# Patient Record
Sex: MALE | Race: WHITE | Employment: FULL TIME | URBAN - METROPOLITAN AREA
[De-identification: names, ages, dates, MRNs, and addresses within clinical notes are randomized per-mention and may not be internally consistent; named-entity substitution may affect disease eponyms.]

---

## 2021-06-08 ENCOUNTER — APPOINTMENT (OUTPATIENT)
Dept: GENERAL RADIOLOGY | Age: 36
End: 2021-06-08
Payer: COMMERCIAL

## 2021-06-08 ENCOUNTER — HOSPITAL ENCOUNTER (EMERGENCY)
Age: 36
Discharge: HOME | End: 2021-06-09
Payer: COMMERCIAL

## 2021-06-08 DIAGNOSIS — S91.312A LACERATION OF LEFT FOOT, INITIAL ENCOUNTER: Primary | ICD-10-CM

## 2021-06-08 PROCEDURE — 73610 X-RAY EXAM OF ANKLE: CPT

## 2021-06-08 PROCEDURE — 99283 EMERGENCY DEPT VISIT LOW MDM: CPT

## 2021-06-08 PROCEDURE — 12002 RPR S/N/AX/GEN/TRNK2.6-7.5CM: CPT

## 2021-06-08 ASSESSMENT — PAIN SCALES - GENERAL: PAINLEVEL_OUTOF10: 5

## 2021-06-08 ASSESSMENT — PAIN DESCRIPTION - PROGRESSION: CLINICAL_PROGRESSION: NOT CHANGED

## 2021-06-08 ASSESSMENT — PAIN DESCRIPTION - DESCRIPTORS: DESCRIPTORS: ACHING

## 2021-06-08 ASSESSMENT — PAIN DESCRIPTION - ONSET: ONSET: ON-GOING

## 2021-06-08 ASSESSMENT — PAIN DESCRIPTION - LOCATION: LOCATION: FOOT

## 2021-06-08 ASSESSMENT — PAIN DESCRIPTION - FREQUENCY: FREQUENCY: CONTINUOUS

## 2021-06-08 ASSESSMENT — PAIN DESCRIPTION - PAIN TYPE: TYPE: ACUTE PAIN

## 2021-06-08 ASSESSMENT — PAIN DESCRIPTION - ORIENTATION: ORIENTATION: LEFT

## 2021-06-08 ASSESSMENT — PAIN - FUNCTIONAL ASSESSMENT: PAIN_FUNCTIONAL_ASSESSMENT: PREVENTS OR INTERFERES SOME ACTIVE ACTIVITIES AND ADLS

## 2021-06-09 VITALS
RESPIRATION RATE: 15 BRPM | DIASTOLIC BLOOD PRESSURE: 87 MMHG | OXYGEN SATURATION: 99 % | TEMPERATURE: 98.4 F | BODY MASS INDEX: 45.9 KG/M2 | SYSTOLIC BLOOD PRESSURE: 175 MMHG | WEIGHT: 310.85 LBS | HEART RATE: 78 BPM

## 2021-06-09 PROCEDURE — 6360000002 HC RX W HCPCS: Performed by: NURSE PRACTITIONER

## 2021-06-09 PROCEDURE — 90715 TDAP VACCINE 7 YRS/> IM: CPT | Performed by: NURSE PRACTITIONER

## 2021-06-09 PROCEDURE — 90471 IMMUNIZATION ADMIN: CPT | Performed by: NURSE PRACTITIONER

## 2021-06-09 RX ADMIN — TETANUS TOXOID, REDUCED DIPHTHERIA TOXOID AND ACELLULAR PERTUSSIS VACCINE, ADSORBED 0.5 ML: 5; 2.5; 8; 8; 2.5 SUSPENSION INTRAMUSCULAR at 01:02

## 2021-06-09 ASSESSMENT — PAIN SCALES - GENERAL: PAINLEVEL_OUTOF10: 0
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
## 2022-05-23 ENCOUNTER — OFFICE VISIT (OUTPATIENT)
Dept: FAMILY MEDICINE CLINIC | Age: 37
End: 2022-05-23
Payer: COMMERCIAL

## 2022-05-23 VITALS
WEIGHT: 315 LBS | BODY MASS INDEX: 46.65 KG/M2 | SYSTOLIC BLOOD PRESSURE: 136 MMHG | RESPIRATION RATE: 16 BRPM | HEART RATE: 81 BPM | TEMPERATURE: 98.4 F | HEIGHT: 69 IN | DIASTOLIC BLOOD PRESSURE: 86 MMHG | OXYGEN SATURATION: 97 %

## 2022-05-23 DIAGNOSIS — D17.22 LIPOMA OF LEFT UPPER EXTREMITY: ICD-10-CM

## 2022-05-23 DIAGNOSIS — R06.83 SNORING: ICD-10-CM

## 2022-05-23 DIAGNOSIS — R10.9 LEFT FLANK PAIN: ICD-10-CM

## 2022-05-23 DIAGNOSIS — R03.0 ELEVATED BP WITHOUT DIAGNOSIS OF HYPERTENSION: ICD-10-CM

## 2022-05-23 DIAGNOSIS — E66.01 OBESITY, MORBID, BMI 40.0-49.9 (HCC): ICD-10-CM

## 2022-05-23 PROCEDURE — 99214 OFFICE O/P EST MOD 30 MIN: CPT | Performed by: NURSE PRACTITIONER

## 2022-05-23 RX ORDER — CYCLOBENZAPRINE HCL 10 MG
10 TABLET ORAL NIGHTLY PRN
Qty: 10 TABLET | Refills: 0 | Status: SHIPPED | OUTPATIENT
Start: 2022-05-23 | End: 2022-06-02

## 2022-05-23 RX ORDER — FAMOTIDINE 20 MG/1
20 TABLET, FILM COATED ORAL 2 TIMES DAILY
COMMUNITY
End: 2022-06-07 | Stop reason: SDUPTHER

## 2022-05-23 ASSESSMENT — ENCOUNTER SYMPTOMS
NAUSEA: 0
CONSTIPATION: 0
ABDOMINAL PAIN: 0
BLOOD IN STOOL: 0
SHORTNESS OF BREATH: 0
DIARRHEA: 0
COUGH: 0
VOMITING: 0

## 2022-05-23 ASSESSMENT — PATIENT HEALTH QUESTIONNAIRE - PHQ9
1. LITTLE INTEREST OR PLEASURE IN DOING THINGS: 0
SUM OF ALL RESPONSES TO PHQ QUESTIONS 1-9: 0
2. FEELING DOWN, DEPRESSED OR HOPELESS: 0
SUM OF ALL RESPONSES TO PHQ9 QUESTIONS 1 & 2: 0

## 2022-05-23 NOTE — PROGRESS NOTES
tablet Take 1 tablet by mouth 2 times daily (Patient not taking: Reported on 5/23/2022) 60 tablet 3     No current facility-administered medications for this visit. No Known Allergies    Review of Systems   Constitutional: Negative for activity change and fever. Respiratory: Negative for cough and shortness of breath. Cardiovascular: Negative for chest pain. Gastrointestinal: Negative for abdominal pain, blood in stool, constipation, diarrhea, nausea and vomiting. Genitourinary: Positive for flank pain. Negative for difficulty urinating, dysuria and hematuria. Neurological: Negative for dizziness and headaches. Vitals:    05/23/22 0909   BP: 136/86   Site: Right Upper Arm   Position: Sitting   Cuff Size: Large Adult   Pulse: 81   Resp: 16   Temp: 98.4 °F (36.9 °C)   TempSrc: Oral   SpO2: 97%   Weight: (!) 327 lb (148.3 kg)   Height: 5' 9\" (1.753 m)       Body mass index is 48.29 kg/m². Wt Readings from Last 3 Encounters:   05/23/22 (!) 327 lb (148.3 kg)   06/08/21 (!) 310 lb 13.6 oz (141 kg)   10/20/16 (!) 354 lb (160.6 kg)       BP Readings from Last 3 Encounters:   05/23/22 136/86   06/09/21 (!) 175/87   10/20/16 120/78       Physical Exam  Vitals and nursing note reviewed. Constitutional:       General: He is not in acute distress. Appearance: He is well-developed. HENT:      Head: Normocephalic and atraumatic. Cardiovascular:      Rate and Rhythm: Normal rate and regular rhythm. Heart sounds: Normal heart sounds. No murmur heard. No friction rub. No gallop. Pulmonary:      Effort: Pulmonary effort is normal. No respiratory distress. Breath sounds: Normal breath sounds. Musculoskeletal:      Cervical back: Neck supple. Skin:     General: Skin is warm and dry. Comments: Left lateral upper arm subcutaneous nodule. Area is slightly tender to palpation. Neurological:      Mental Status: He is alert and oriented to person, place, and time.    Psychiatric: Behavior: Behavior normal.         Thought Content: Thought content normal.         Judgment: Judgment normal.         Assessmentand Plan  Kathya Friend was seen today. Diagnoses and all orders for this visit:    Elevated BP without diagnosis of hypertension  -     Comprehensive Metabolic Panel; Future  Discussed with patient that blood pressure today is high end of normal.  Discussed the importance of a low-salt diet, aerobic exercise, and weight loss. Will continue to monitor blood pressure in the future. Left flank pain  -     cyclobenzaprine (FLEXERIL) 10 MG tablet; Take 1 tablet by mouth nightly as needed for Muscle spasms  Suspect that pain is muscular skeletal in nature. We will have him take over-the-counter ibuprofen 400 to 600 mg 3 times daily as needed with food. Cyclobenzaprine 10 mg nightly as needed. Advised to ice area as needed. Patient is to call if symptoms worsen or fail to improve before next follow-up visit. Lipoma of left upper extremity  -     [facility]fani Smith MD, General Surgery, [facility]  Patient reports area is enlarging. Will refer to general surgery for evaluation. Obesity, morbid, BMI 40.0-49.9 ([address]  -     Comprehensive Metabolic Panel; Future  -     Hemoglobin A1C; Future  -     Lipid Panel; Future  Diet, aerobic exercise, and weight loss discussed and needed. Patient declines referral to [facility]. Snoring  -     Gayatri Prescott MD, Sleep Medicine, [facility]  Refer to sleep specialist for sleep study to evaluate for sleep apnea. Return in about 2 weeks (around 6/6/2022), or if symptoms worsen or fail to improve, for side pain .

## 2022-06-01 DIAGNOSIS — R03.0 ELEVATED BP WITHOUT DIAGNOSIS OF HYPERTENSION: ICD-10-CM

## 2022-06-01 DIAGNOSIS — E66.01 OBESITY, MORBID, BMI 40.0-49.9 (HCC): ICD-10-CM

## 2022-06-01 LAB
A/G RATIO: 1.6 (ref 1.1–2.2)
ALBUMIN SERPL-MCNC: 4.3 G/DL (ref 3.4–5)
ALP BLD-CCNC: 78 U/L (ref 40–129)
ALT SERPL-CCNC: 13 U/L (ref 10–40)
ANION GAP SERPL CALCULATED.3IONS-SCNC: 14 MMOL/L (ref 3–16)
AST SERPL-CCNC: 13 U/L (ref 15–37)
BILIRUB SERPL-MCNC: 0.3 MG/DL (ref 0–1)
BUN BLDV-MCNC: 13 MG/DL (ref 7–20)
CALCIUM SERPL-MCNC: 9.3 MG/DL (ref 8.3–10.6)
CHLORIDE BLD-SCNC: 98 MMOL/L (ref 99–110)
CHOLESTEROL, TOTAL: 176 MG/DL (ref 0–199)
CO2: 26 MMOL/L (ref 21–32)
CREAT SERPL-MCNC: 0.9 MG/DL (ref 0.9–1.3)
GFR AFRICAN AMERICAN: >60
GFR NON-AFRICAN AMERICAN: >60
GLUCOSE BLD-MCNC: 97 MG/DL (ref 70–99)
HDLC SERPL-MCNC: 29 MG/DL (ref 40–60)
LDL CHOLESTEROL CALCULATED: 104 MG/DL
POTASSIUM SERPL-SCNC: 4.3 MMOL/L (ref 3.5–5.1)
SODIUM BLD-SCNC: 138 MMOL/L (ref 136–145)
TOTAL PROTEIN: 7 G/DL (ref 6.4–8.2)
TRIGL SERPL-MCNC: 213 MG/DL (ref 0–150)
VLDLC SERPL CALC-MCNC: 43 MG/DL

## 2022-06-02 LAB
ESTIMATED AVERAGE GLUCOSE: 102.5 MG/DL
HBA1C MFR BLD: 5.2 %

## 2022-06-07 ENCOUNTER — OFFICE VISIT (OUTPATIENT)
Dept: FAMILY MEDICINE CLINIC | Age: 37
End: 2022-06-07
Payer: COMMERCIAL

## 2022-06-07 VITALS
OXYGEN SATURATION: 97 % | WEIGHT: 315 LBS | HEART RATE: 86 BPM | DIASTOLIC BLOOD PRESSURE: 88 MMHG | SYSTOLIC BLOOD PRESSURE: 138 MMHG | BODY MASS INDEX: 46.65 KG/M2 | RESPIRATION RATE: 18 BRPM | HEIGHT: 69 IN

## 2022-06-07 DIAGNOSIS — K21.9 GASTROESOPHAGEAL REFLUX DISEASE WITHOUT ESOPHAGITIS: ICD-10-CM

## 2022-06-07 DIAGNOSIS — F41.9 ANXIETY: ICD-10-CM

## 2022-06-07 DIAGNOSIS — R10.9 LEFT FLANK PAIN: Primary | ICD-10-CM

## 2022-06-07 DIAGNOSIS — D17.22 LIPOMA OF LEFT UPPER EXTREMITY: ICD-10-CM

## 2022-06-07 PROCEDURE — 99213 OFFICE O/P EST LOW 20 MIN: CPT

## 2022-06-07 RX ORDER — FAMOTIDINE 20 MG/1
20 TABLET, FILM COATED ORAL 2 TIMES DAILY
Qty: 60 TABLET | Refills: 2 | Status: SHIPPED | OUTPATIENT
Start: 2022-06-07 | End: 2022-09-20

## 2022-06-07 ASSESSMENT — ENCOUNTER SYMPTOMS
SHORTNESS OF BREATH: 0
ABDOMINAL PAIN: 0
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
## 2022-06-16 ENCOUNTER — OFFICE VISIT (OUTPATIENT)
Dept: SURGERY | Age: 37
End: 2022-06-16
Payer: COMMERCIAL

## 2022-06-16 VITALS
WEIGHT: 315 LBS | DIASTOLIC BLOOD PRESSURE: 104 MMHG | SYSTOLIC BLOOD PRESSURE: 150 MMHG | BODY MASS INDEX: 47.55 KG/M2 | HEART RATE: 84 BPM

## 2022-06-16 DIAGNOSIS — D17.22 LIPOMA OF LEFT UPPER EXTREMITY: Primary | ICD-10-CM

## 2022-06-16 PROCEDURE — 99203 OFFICE O/P NEW LOW 30 MIN: CPT | Performed by: SURGERY

## 2022-06-16 PROCEDURE — 24075 EXC ARM/ELBOW LES SC < 3 CM: CPT | Performed by: SURGERY
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
## 2022-06-16 NOTE — Clinical Note
Camelia Li,     I just saw Mr. Devyn Flores for his left upper arm lipoma. I excised it in the office, which he tolerated well. He is going to follow up with me as needed. Thank you for allowing me to take care of Mr. Stanley with you.     Tita Barraza

## 2022-06-16 NOTE — PATIENT INSTRUCTIONS
Numbing medication will wear off in 1-2 hours. OK to take tylenol or ibuprofen for pain control. OK to shower overtop of water tight glue. Avoid swimming for 2-3 weeks. Sutures are dissolvable. These will absorb over time. No restrictions, activities as tolerated. Follow up as needed. Dr. Guy Hamilton will call with the pathology results. Call for any worsening redness, pain, or fevers greater than 101.5  If questions or concerns, please call Nicole @ [phone].

## 2022-09-20 DIAGNOSIS — K21.9 GASTROESOPHAGEAL REFLUX DISEASE WITHOUT ESOPHAGITIS: ICD-10-CM

## 2022-09-20 RX ORDER — FAMOTIDINE 20 MG/1
TABLET, FILM COATED ORAL
Qty: 180 TABLET | Refills: 1 | Status: SHIPPED | OUTPATIENT
Start: 2022-09-20

## 2022-12-08 ENCOUNTER — OFFICE VISIT (OUTPATIENT)
Dept: FAMILY MEDICINE CLINIC | Age: 37
End: 2022-12-08
Payer: COMMERCIAL

## 2022-12-08 VITALS
HEART RATE: 82 BPM | DIASTOLIC BLOOD PRESSURE: 80 MMHG | WEIGHT: 315 LBS | OXYGEN SATURATION: 97 % | HEIGHT: 69 IN | BODY MASS INDEX: 46.65 KG/M2 | SYSTOLIC BLOOD PRESSURE: 122 MMHG

## 2022-12-08 DIAGNOSIS — K21.9 GASTROESOPHAGEAL REFLUX DISEASE WITHOUT ESOPHAGITIS: ICD-10-CM

## 2022-12-08 DIAGNOSIS — I83.93 ASYMPTOMATIC VARICOSE VEINS OF BOTH LOWER EXTREMITIES: Primary | ICD-10-CM

## 2022-12-08 DIAGNOSIS — E66.01 OBESITY, MORBID, BMI 40.0-49.9 (HCC): ICD-10-CM

## 2022-12-08 PROCEDURE — 99213 OFFICE O/P EST LOW 20 MIN: CPT | Performed by: FAMILY MEDICINE

## 2022-12-08 NOTE — PATIENT INSTRUCTIONS
INSTRUCTIONS  NEXT APPOINTMENT: Please schedule fasting annual physical (30 minutes) in 6 months. OK to have water, black coffee and medications (except for diabetes medicines)   Look into cell phone raffi \"Lose it\" or online at [email or web address]. Check weight couple times a week. Read handouts on varicose veins and weight. Patient Education      TIPS ON WEIGHT LOSS    Weight loss maintenance is considered successful if you lose at least 10 percent of your body weight and keep that weight off for at least one year. Ideas for better weight control. Try implementing one a week. Drink only sugar free beverages. Drink at least 8 cups per day. Do not eat after 7 PM.  Snack every 2 hours during the day on 100 calorie snacks (apple, strawberry, almonds, pistachio, walnuts, cheese cubes, raw veggies like bell peppers, tomato, celery, zucchini, snow peas, broccoli). At meals, limit portion sizes to what you could hold in your hand of a meat. Eat all of the raw vegetables and salads that you want with vinegar or low anel dressing. Sleep 8 hours at night. Minimize white starches- bread, pasta, rice potatoes. Try high fiber cereal, breads, granola. Move more- try walking 15 minutes per day. Use small plates and bowls to make serving look bigger. Keeping track of calories and fat grams. Try cell phone raffi called \"Lose-it\"  Planning your meals ahead of time  Eating breakfast every day  Keeping your diet steady. Eating the same on weekends,vacations and special occasions. Watching less than 10 hours of television per week    Should I take weight loss medicines? Taking medicines may work better than diet and exercise alone for some people. OTC orlistat (brand: Hanna Hair) can help weight loss. A multivitamin must be taken every day to prevent vitamin deficiencies. Many people regain weight after they stop taking these medicines. Should I have weight loss surgery?    Surgery can help with long-term weight loss maintenance, BUT people who make major lifestyle changes can get the same results. Patient Education      VARICOSE VEINS    What Are Varicose Veins? Varicose (GABBI-i-kos) veins are swollen, twisted veins that you can see just under the surface of the skin. These veins usually occur in the legs, but they also can form in other parts of the body. Varicose veins are a common condition. They usually cause few signs and symptoms. Sometimes varicose veins cause mild to moderate pain, blood clots, skin ulcers (sores), or other problems. Overview  Veins are blood vessels that carry blood from your body's tissues to your heart. Your heart pumps the blood to your lungs to  oxygen. The oxygen-rich blood then is pumped to your body through blood vessels called arteries. From your arteries, the blood flows through tiny blood vessels called capillaries, where it gives up its oxygen to the body's tissues. Your blood then returns to your heart through your veins to  more oxygen. For more information about blood flow, go to the Health Topics How the Heart Works article. Veins have one-way valves that help keep blood flowing toward your heart. If the valves are weak or damaged, blood can back up and pool in your veins. This causes the veins to swell, which can lead to varicose veins. Many factors can raise your risk for varicose veins. Examples of these factors include family history, older age, gender, pregnancy, overweight or obesity, and lack of movement. Varicose veins are treated with lifestyle changes and medical procedures. The goals of treatment are to relieve symptoms, prevent complications, and improve appearance. [place]  Varicose veins usually don't cause medical problems. If they do, your doctor may simply suggest making lifestyle changes. Sometimes varicose veins cause pain, blood clots, skin ulcers, or other problems.  If this happens, your doctor may recommend one or more medical procedures. Some people choose to have these procedures to improve the way their veins look or to relieve pain. Many treatments for varicose veins are quick and easy and don't require a long recovery. Vein Problems Related to Varicose Veins  Many vein problems are related to varicose veins, such as telangiectasias (mge-HN-rjy-av-QE-fy-uhs), spider veins, varicoceles (IMY-z-ie-seals), and other vein problems. Telangiectasias  Telangiectasias are small clusters of blood vessels. They're usually found on the upper body, including the face. These blood vessels appear red. They may form during pregnancy, and often they develop in people who have certain genetic disorders, viral infections, or other conditions, such as liver disease. Because telangiectasias can be a sign of a more serious condition, see your doctor if you think you have them. Spider Veins  Spider veins are a smaller version of varicose veins and a less serious type of telangiectasias. Spider veins involve the capillaries, the smallest blood vessels in the body. Spider veins often appear on the legs and face. They're red or blue and usually look like a spider web or tree branch. These veins usually aren't a medical concern. Varicoceles  Varicoceles are varicose veins in the scrotum (the skin over the testicles). Varicoceles may be linked to male infertility. If you think you have varicoceles, see your doctor. Other Related Vein Problems  Other types of varicose veins include venous lakes, reticular veins, and hemorrhoids. Venous lakes are varicose veins that appear on the face and neck. Reticular veins are flat blue veins often seen behind the knees. Hemorrhoids are varicose veins in and around the anus. What Causes Varicose Veins? Weak or damaged valves in the veins can cause varicose veins. After your arteries and capillaries deliver oxygen-rich blood to your body, your veins return the blood to your heart.  The veins in your legs must work against gravity to do this. One-way valves inside the veins open to let blood flow through, and then they shut to keep blood from flowing backward. If the valves are weak or damaged, blood can back up and pool in your veins. This causes the veins to swell. Weak vein walls may cause weak valves. Normally, the walls of the veins are elastic (stretchy). If these walls become weak, they lose their normal elasticity. They become like an overstretched rubber band. This makes the walls of the veins longer and wider, and it causes the flaps of the valves to separate. When the valve flaps separate, blood can flow backward through the valves. The backflow of blood fills the veins and stretches the walls even more. As a result, the veins get bigger, swell, and often twist as they try to squeeze into their normal space. These are varicose veins. Normal Vein and Varicose Vein    Figure A shows a normal vein with a working valve and normal blood flow. Figure B shows a varicose vein with a deformed valve, abnormal blood flow, and thin, stretched walls. The middle image shows where varicose veins might appear in a leg. Older age or a family history of varicose veins may raise your risk for weak vein walls. You also may be at higher risk if you have increased pressure in your veins due to overweight or obesity or pregnancy. Who Is at Risk for Varicose Veins? Many factors may raise your risk for varicose veins, including family history, older age, gender, pregnancy, overweight or obesity, and lack of movement. Family History  Having family members who have varicose veins may raise your risk for the condition. About half of all people who have varicose veins have a family history of them. Older Age  Getting older may raise your risk for varicose veins. The normal wear and tear of aging may cause the valves in your veins to weaken and not work well.   Gender  Women tend to get varicose veins more often than men. Hormonal changes that occur during puberty, pregnancy, and menopause (or with the use of birth control pills) may raise a woman's risk for varicose veins. Pregnancy  During pregnancy, the growing fetus puts pressure on the veins in the mother's legs. Varicose veins that occur during pregnancy usually get better within 3 to   12 months of delivery. Overweight or Obesity  Being overweight or obese can put extra pressure on your veins. This can lead to varicose veins. For more information about overweight and obesity, go to the Health Topics Overweight and Obesity article. Lack of Movement  Standing or sitting for a long time, especially with your legs bent or crossed, may raise your risk for varicose veins. This is because staying in one position for a long time may force your veins to work harder to pump blood to your heart. What Are the Signs and Symptoms of Varicose Veins? The signs and symptoms of varicose veins include:  Large veins that you can see just under the surface of your skin. Mild swelling of your ankles and feet. Painful, achy, or \"heavy\" legs. Throbbing or cramping in your legs. Itchy legs, especially on the lower leg and ankle. Sometimes this symptom is incorrectly diagnosed as dry skin. Discolored skin in the area around the varicose vein. Signs of telangiectasias are clusters of red veins that you can see just under the surface of your skin. These clusters usually are found on the upper body, including the face. Signs of spider veins are red or blue veins in a web or tree branch pattern. Often, these veins appear on the legs and face. See your doctor if you have these signs and symptoms. They also may be signs of other, more serious conditions. Complications of Varicose Veins  Varicose veins can lead to dermatitis (fal-vg-SC-tis), an itchy rash. If you have varicose veins in your legs, dermatitis may affect your lower leg or ankle.  Dermatitis can cause bleeding or skin ulcers (sores) if the skin is scratched or irritated. Varicose veins also can lead to a condition called superficial thrombophlebitis (JNSUH-cm-nmaj-BI-tis). Thrombophlebitis is a blood clot in a vein. Superficial thrombophlebitis means that the blood clot occurs in a vein close to the surface of the skin. This type of blood clot may cause pain and other problems in the affected area. How Are Varicose Veins Diagnosed? Doctors often diagnose varicose veins based on a physical exam alone. Sometimes tests or procedures are used to find out the extent of the problem or to rule out other conditions. Diagnostic Tests and Procedures  Doppler Ultrasound  Your doctor may recommend a Doppler ultrasound to check blood flow in your veins and to look for blood clots. A Doppler ultrasound uses sound waves to create pictures of the structures in your body. During this test, a handheld device will be placed on your body and passed back and forth over the affected area. The device sends and receives sound waves. A computer will convert the sound waves into a picture of the blood flow in your arteries and veins. Angiogram  Your doctor may recommend an angiogram to get a more detailed look at the blood flow through your veins. For this procedure, dye is injected into your veins. The dye outlines your veins on x-ray images. An angiogram can help your doctor confirm whether you have varicose veins or another condition. How Are Varicose Veins Treated? Varicose veins are treated with lifestyle changes and medical procedures. The goals of treatment are to relieve symptoms, prevent complications, and improve appearance. If varicose veins cause few symptoms, your doctor may simply suggest making lifestyle changes. If your symptoms are more severe, your doctor may recommend one or more medical procedures.  For example, you may need a medical procedure if you have a lot of pain, blood clots, or skin disorders caused by your varicose veins. Some people who have varicose veins choose to have procedures to improve how their veins look. Although treatment can help existing varicose veins, it can't keep new varicose veins from forming. Lifestyle Changes  Lifestyle changes often are the first treatment for varicose veins. These changes can prevent varicose veins from getting worse, reduce pain, and delay other varicose veins from forming. Lifestyle changes include the following:  Avoid standing or sitting for long periods without taking a break. When sitting, avoid crossing your legs. Keep your legs raised when sitting, resting, or sleeping. When you can, raise your legs above the level of your heart. Do physical activities to get your legs moving and improve muscle tone. This helps blood move through your veins. If you're overweight or obese, try to lose weight. This will improve blood flow and ease the pressure on your veins. Avoid wearing tight clothes, especially those that are tight around your waist, groin (upper thighs), and legs. Tight clothes can make varicose veins worse. Avoid wearing high heels for long periods. Lower heeled shoes can help tone your calf muscles. Toned muscles help blood move through the veins. Your doctor may recommend compression stockings. These stockings create gentle pressure up the leg. This pressure keeps blood from pooling and decreases swelling in the legs. There are three types of compression stockings. One type is support pantyhose. These offer the least amount of pressure. A second type is over-the-counter compression hose. These stockings give a little more pressure than support pantyhose. Over-the-counter compression hose are sold in medical supply stores and pharmacies. Prescription-strength compression hose are the third type of compression stockings. These stockings offer the greatest amount of pressure. They also are sold in medical supply stores and pharmacies.  However, you need to be fitted for them in the store by a specially trained person. Medical Procedures  Medical procedures are done either to remove varicose veins or to close them. Removing or closing varicose veins usually doesn't cause problems with blood flow because the blood starts moving through other veins. You may be treated with one or more of the procedures described below. Common side effects right after most of these procedures include bruising, swelling, skin discoloration, and slight pain. The side effects are most severe with vein stripping and ligation (li-GA-shun). Rarely, this procedure can cause severe pain, infections, blood clots, and scarring. Sclerotherapy  Sclerotherapy (IMLFT-i-gpjw-ah-pe) uses a liquid chemical to close off a varicose vein. The chemical is injected into the vein to cause irritation and scarring inside the vein. The irritation and scarring cause the vein to close off, and it fades away. This procedure often is used to treat smaller varicose veins and spider veins. It can be done in your doctor's office, while you stand. You may need several treatments to completely close off a vein. Treatments typically are done every 4 to 6 weeks. Following treatments, your legs will be wrapped in elastic bandaging to help with healing and decrease swelling. Microsclerotherapy  Microsclerotherapy (ZQ-iru-OZKJA-r-fxux-vz-pe) is used to treat spider veins and other very small varicose veins. A small amount of liquid chemical is injected into a vein using a very fine needle. The chemical scars the inner lining of the vein, causing it to close off. Laser Surgery  This procedure applies light energy from a laser onto a varicose vein. The laser light makes the vein fade away. Laser surgery mostly is used to treat smaller varicose veins. No cutting or injection of chemicals is involved.     Endovenous Ablation Therapy  Endovenous ablation (ab-LA-shun) therapy uses lasers or radiowaves to create heat to close off a varicose vein. Your doctor makes a tiny cut in your skin near the varicose vein. He or she then inserts a small tube called a catheter into the vein. A device at the tip of the tube heats up the inside of the vein and closes it off. You'll be awake during this procedure, but your doctor will numb the area around the vein. You usually can go home the same day as the procedure. Endoscopic Vein Surgery  For endoscopic (pt-es-KXSE-ik) vein surgery, your doctor will make a small cut in your skin near a varicose vein. He or she then uses a tiny camera at the end of a thin tube to move through the vein. A surgical device at the end of the camera is used to close the vein. Endoscopic vein surgery usually is used only in severe cases when varicose veins are causing skin ulcers (sores). After the procedure, you usually can return to your normal activities within a few weeks. Ambulatory Phlebectomy  For ambulatory phlebectomy (dfg-OCD-af-me), your doctor will make small cuts in your skin to remove small varicose veins. This procedure usually is done to remove the varicose veins closest to the surface of your skin. You'll be awake during the procedure, but your doctor will numb the area around the vein. Usually, you can go home the same day that the procedure is done. Vein Stripping and Ligation  Vein stripping and ligation typically is done only for severe cases of varicose veins. The procedure involves tying shut and removing the veins through small cuts in your skin. You'll be given medicine to temporarily put you to sleep so you don't feel any pain during the procedure. Vein stripping and ligation usually is done as an outpatient procedure. The recovery time from the procedure is about 1 to 4 weeks. How Can Varicose Veins Be Prevented? You can't prevent varicose veins from forming. However, you can prevent the ones you have from getting worse.  You also can take steps to delay other varicose veins from forming. Avoid standing or sitting for long periods without taking a break. When sitting, avoid crossing your legs. Keep your legs raised when sitting, resting, or sleeping. When you can, raise your legs above the level of your heart. Do physical activities to get your legs moving and improve muscle tone. This helps blood move through your veins. If you're overweight or obese, try to lose weight. This will improve blood flow and ease the pressure on your veins. Avoid wearing tight clothes, especially those that are tight around your waist, groin (upper thighs), and legs. Tight clothes can make varicose veins worse. Avoid wearing high heels for long periods. Lower heeled shoes can help tone your calf muscles. Toned muscles help blood move through the veins. Wear compression stockings if your doctor recommends them. These stockings create gentle pressure up the leg. This pressure keeps blood from pooling in the veins and decreases swelling in the legs. Living With Varicose Veins  Varicose veins are a common condition. They often cause few signs and symptoms. If your signs and symptoms are minor, your doctor may simply suggest making lifestyle changes. If your condition is more severe--for example, if you have pain, blood clots, or skin ulcers (sores)--your doctor may recommend one or more medical procedures. Many treatments for varicose veins are quick and easy and don't require a long recovery.

## 2022-12-08 NOTE — PROGRESS NOTES
PROBLEM VISIT NOTE     Subjective:     Chief Complaint   Patient presents with    Leg Pain     6 mo f/u legs hurt , check circulation     Federica Le is a 40 y.o. male who presents log joints ache when stand at work all day. Leg muscle spasms better with hydration and potassium. Concerned about veins in legs. Denies claudication. Health Maintenance Due   Topic Date Due    COVID-19 Vaccine (1) Never done    Varicella vaccine (1 of 2 - 2-dose childhood series) Never done    HIV screen  Never done    Hepatitis C screen  Never done    Flu vaccine (1) 08/01/2022     CHART REVIEW   reports that he has been smoking cigarettes.  He has never used smokeless tobacco.  Health Maintenance Due   Topic Date Due    COVID-19 Vaccine (1) Never done    Varicella vaccine (1 of 2 - 2-dose childhood series) Never done    HIV screen  Never done    Hepatitis C screen  Never done    Flu vaccine (1) 08/01/2022     Current Outpatient Medications   Medication Instructions    famotidine (PEPCID) 20 MG tablet TAKE 1 TABLET(20 MG) BY MOUTH TWICE DAILY     LAST LABS  LDL Calculated   Date Value Ref Range Status   06/01/2022 104 (H) <100 mg/dL Final     Lab Results   Component Value Date    HDL 29 (L) 06/01/2022     Lab Results   Component Value Date    TRIG 213 (H) 06/01/2022     Lab Results   Component Value Date     06/01/2022    K 4.3 06/01/2022    CREATININE 0.9 06/01/2022     No results found for: WBC, HGB, PLT  Lab Results   Component Value Date    ALT 13 06/01/2022    AST 13 (L) 06/01/2022    ALKPHOS 78 06/01/2022    BILITOT 0.3 06/01/2022     No results found for: TSH  Lab Results   Component Value Date    LABA1C 5.2 06/01/2022     Objective:   PHYSICAL EXAM   /80 (Site: Left Upper Arm, Position: Sitting, Cuff Size: Large Adult)   Pulse 82   Ht 5' 9\" (1.753 m)   Wt (!) 325 lb (147.4 kg)   SpO2 97%   BMI 47.99 kg/m²   BP Readings from Last 5 Encounters:   12/08/22 122/80   06/16/22 (!) 150/104   06/07/22 138/88 05/23/22 136/86   06/09/21 (!) 175/87     Wt Readings from Last 5 Encounters:   12/08/22 (!) 325 lb (147.4 kg)   06/16/22 (!) 322 lb (146.1 kg)   06/07/22 (!) 324 lb (147 kg)   05/23/22 (!) 327 lb (148.3 kg)   06/08/21 (!) 310 lb 13.6 oz (141 kg)      GENERAL:   well-developed, well-nourished, alert, no distress. LUNGS:    Breathing unlabored  clear to auscultation bilaterally and good air movement  CARDIOVASC:   regular rate and rhythm  Scattered varicose veins of lower legs and thighs  SKIN: warm and dry     Assessment and Plan:      Diagnosis Orders   1. Asymptomatic varicose veins of both lower extremities        2. Gastroesophageal reflux disease without esophagitis        3. Obesity, morbid, BMI 40.0-49.9 ([address]           INSTRUCTIONS  NEXT APPOINTMENT: Please schedule fasting annual physical (30 minutes) in 6 months. OK to have water, black coffee and medications (except for diabetes medicines)   Look into cell phone raffi \"Lose it\" or online at [email or web address]. Check weight couple times a week. Read handouts on varicose veins and weight.

## 2023-02-07 ENCOUNTER — OFFICE VISIT (OUTPATIENT)
Dept: FAMILY MEDICINE CLINIC | Age: 38
End: 2023-02-07
Payer: COMMERCIAL

## 2023-02-07 VITALS
SYSTOLIC BLOOD PRESSURE: 122 MMHG | BODY MASS INDEX: 46.65 KG/M2 | HEART RATE: 70 BPM | WEIGHT: 315 LBS | DIASTOLIC BLOOD PRESSURE: 82 MMHG | OXYGEN SATURATION: 97 % | HEIGHT: 69 IN

## 2023-02-07 DIAGNOSIS — Z00.00 ENCOUNTER FOR WELL ADULT EXAM WITHOUT ABNORMAL FINDINGS: Primary | ICD-10-CM

## 2023-02-07 DIAGNOSIS — E66.01 OBESITY, MORBID, BMI 40.0-49.9 (HCC): ICD-10-CM

## 2023-02-07 DIAGNOSIS — Z72.0 TOBACCO ABUSE: ICD-10-CM

## 2023-02-07 DIAGNOSIS — K21.9 GASTROESOPHAGEAL REFLUX DISEASE WITHOUT ESOPHAGITIS: ICD-10-CM

## 2023-02-07 DIAGNOSIS — Z28.21 COVID-19 VACCINE SERIES DECLINED: ICD-10-CM

## 2023-02-07 DIAGNOSIS — Z23 NEEDS FLU SHOT: ICD-10-CM

## 2023-02-07 DIAGNOSIS — Z28.310 COVID-19 VACCINE SERIES DECLINED: ICD-10-CM

## 2023-02-07 DIAGNOSIS — F41.9 ANXIETY: ICD-10-CM

## 2023-02-07 PROCEDURE — 99395 PREV VISIT EST AGE 18-39: CPT

## 2023-02-07 SDOH — ECONOMIC STABILITY: FOOD INSECURITY: WITHIN THE PAST 12 MONTHS, THE FOOD YOU BOUGHT JUST DIDN'T LAST AND YOU DIDN'T HAVE MONEY TO GET MORE.: NEVER TRUE

## 2023-02-07 SDOH — ECONOMIC STABILITY: HOUSING INSECURITY
IN THE LAST 12 MONTHS, WAS THERE A TIME WHEN YOU DID NOT HAVE A STEADY PLACE TO SLEEP OR SLEPT IN A SHELTER (INCLUDING NOW)?: NO

## 2023-02-07 SDOH — ECONOMIC STABILITY: INCOME INSECURITY: HOW HARD IS IT FOR YOU TO PAY FOR THE VERY BASICS LIKE FOOD, HOUSING, MEDICAL CARE, AND HEATING?: NOT HARD AT ALL

## 2023-02-07 SDOH — ECONOMIC STABILITY: FOOD INSECURITY: WITHIN THE PAST 12 MONTHS, YOU WORRIED THAT YOUR FOOD WOULD RUN OUT BEFORE YOU GOT MONEY TO BUY MORE.: NEVER TRUE

## 2023-02-07 ASSESSMENT — PATIENT HEALTH QUESTIONNAIRE - PHQ9
SUM OF ALL RESPONSES TO PHQ9 QUESTIONS 1 & 2: 0
SUM OF ALL RESPONSES TO PHQ QUESTIONS 1-9: 0
2. FEELING DOWN, DEPRESSED OR HOPELESS: 0
1. LITTLE INTEREST OR PLEASURE IN DOING THINGS: 0
SUM OF ALL RESPONSES TO PHQ QUESTIONS 1-9: 0

## 2023-02-07 NOTE — PATIENT INSTRUCTIONS
Starting a Weight Loss Plan: Care Instructions  Overview     If you're thinking about losing weight, it can be hard to know where to start. Your doctor can help you set up a weight loss plan that best meets your needs. You may want to take a class on nutrition or exercise, or you could join a weight loss support group. If you have questions about how to make changes to your eating or exercise habits, ask your doctor about seeing a registered dietitian or an exercise specialist.  It can be a big challenge to lose weight. But you don't have to make huge changes at once. Make small changes, and stick with them. When those changes become habit, add a few more changes.  If you don't think you're ready to make changes right now, try to pick a date in the future. Make an appointment to see your doctor to discuss whether the time is right for you to start a plan.  Follow-up care is a key part of your treatment and safety. Be sure to make and go to all appointments, and call your doctor if you are having problems. It's also a good idea to know your test results and keep a list of the medicines you take.  How can you care for yourself at home?  Set realistic goals. Many people expect to lose much more weight than is likely. A weight loss of 5% to 10% of your body weight may be enough to improve your health.  Get family and friends involved to provide support. Talk to them about why you are trying to lose weight, and ask them to help. They can help by participating in exercise and having meals with you, even if they may be eating something different.  Find what works best for you. If you do not have time or do not like to cook, a program that offers meal replacement bars or shakes may be better for you. Or if you like to prepare meals, finding a plan that includes daily menus and recipes may be best.  Ask your doctor about other health professionals who can help you achieve your weight loss goals.  A dietitian can help  you make healthy changes in your diet. An exercise specialist or  can help you develop a safe and effective exercise program.  A counselor or psychiatrist can help you cope with issues such as depression, anxiety, or family problems that can make it hard to focus on weight loss. Consider joining a support group for people who are trying to lose weight. Your doctor can suggest groups in your area. Where can you learn more? Go to http://www.woods.com/ and enter U357 to learn more about \"Starting a Weight Loss Plan: Care Instructions. \"  Current as of: August 25, 2022               Content Version: 13.5  © 4294-5233 [employer]wise, [employer]orporated. Care instructions adapted under license by [facility] ([facility]). If you have questions about a medical condition or this instruction, always ask your healthcare professional. [address] any warranty or liability for your use of this information.
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
## 2023-03-27 DIAGNOSIS — K21.9 GASTROESOPHAGEAL REFLUX DISEASE WITHOUT ESOPHAGITIS: ICD-10-CM

## 2023-03-27 RX ORDER — FAMOTIDINE 20 MG/1
TABLET, FILM COATED ORAL
Qty: 180 TABLET | Refills: 1 | Status: SHIPPED | OUTPATIENT
Start: 2023-03-27

## 2023-10-07 DIAGNOSIS — K21.9 GASTROESOPHAGEAL REFLUX DISEASE WITHOUT ESOPHAGITIS: ICD-10-CM

## 2023-10-10 RX ORDER — FAMOTIDINE 20 MG/1
TABLET, FILM COATED ORAL
Qty: 180 TABLET | Refills: 1 | Status: SHIPPED | OUTPATIENT
Start: 2023-10-10

## 2024-04-09 ENCOUNTER — OFFICE VISIT (OUTPATIENT)
Dept: FAMILY MEDICINE CLINIC | Age: 39
End: 2024-04-09
Payer: COMMERCIAL

## 2024-04-09 VITALS
BODY MASS INDEX: 46.65 KG/M2 | HEART RATE: 76 BPM | HEIGHT: 69 IN | RESPIRATION RATE: 16 BRPM | DIASTOLIC BLOOD PRESSURE: 80 MMHG | WEIGHT: 315 LBS | OXYGEN SATURATION: 95 % | SYSTOLIC BLOOD PRESSURE: 122 MMHG

## 2024-04-09 DIAGNOSIS — Z00.00 ENCOUNTER FOR WELL ADULT EXAM WITHOUT ABNORMAL FINDINGS: Primary | ICD-10-CM

## 2024-04-09 DIAGNOSIS — Z72.0 TOBACCO ABUSE: ICD-10-CM

## 2024-04-09 DIAGNOSIS — K21.9 GASTROESOPHAGEAL REFLUX DISEASE WITHOUT ESOPHAGITIS: ICD-10-CM

## 2024-04-09 DIAGNOSIS — E66.01 OBESITY, MORBID, BMI 40.0-49.9 (HCC): ICD-10-CM

## 2024-04-09 PROCEDURE — 99395 PREV VISIT EST AGE 18-39: CPT | Performed by: FAMILY MEDICINE

## 2024-04-09 RX ORDER — FAMOTIDINE 20 MG/1
TABLET, FILM COATED ORAL
Qty: 180 TABLET | Refills: 3 | Status: SHIPPED | OUTPATIENT
Start: 2024-04-09

## 2024-04-09 SDOH — ECONOMIC STABILITY: FOOD INSECURITY: WITHIN THE PAST 12 MONTHS, THE FOOD YOU BOUGHT JUST DIDN'T LAST AND YOU DIDN'T HAVE MONEY TO GET MORE.: NEVER TRUE

## 2024-04-09 SDOH — ECONOMIC STABILITY: INCOME INSECURITY: HOW HARD IS IT FOR YOU TO PAY FOR THE VERY BASICS LIKE FOOD, HOUSING, MEDICAL CARE, AND HEATING?: NOT HARD AT ALL

## 2024-04-09 SDOH — ECONOMIC STABILITY: FOOD INSECURITY: WITHIN THE PAST 12 MONTHS, YOU WORRIED THAT YOUR FOOD WOULD RUN OUT BEFORE YOU GOT MONEY TO BUY MORE.: NEVER TRUE

## 2024-04-09 ASSESSMENT — PATIENT HEALTH QUESTIONNAIRE - PHQ9
2. FEELING DOWN, DEPRESSED OR HOPELESS: NOT AT ALL
SUM OF ALL RESPONSES TO PHQ QUESTIONS 1-9: 0
1. LITTLE INTEREST OR PLEASURE IN DOING THINGS: NOT AT ALL
SUM OF ALL RESPONSES TO PHQ9 QUESTIONS 1 & 2: 0
SUM OF ALL RESPONSES TO PHQ QUESTIONS 1-9: 0

## 2024-04-09 NOTE — PATIENT INSTRUCTIONS
INSTRUCTIONS  NEXT APPOINTMENT: Please schedule fasting annual physical (30 minutes) in one year.  OK to have water, black coffee and medications (except for diabetes medicines)    PLEASE TAKE THIS FORM TO CHECK-OUT WINDOW TO SCHEDULE NEXT VISIT.   PLEASE GET FASTING BLOODWORK DRAWN SOON.  Lab is on first floor in [address]. Hours Monday to Friday 6:30 AM to 4 PM AND Saturday 8-12.     Please get annual flu and covid vaccines when available in fall.  Can get at stores such as [employer] and [employer].  Look into cell phone raffi \"Lose it\" or online at [email or web address]. Keep track of calories and learn where the calories are hiding.  Start thinking about why smoke and why not to smoke. Think of strategy to quit.  OK to switch back to ranitidine (Zantac).  If shoulder not getting better, see us for referral for PT. Do stretches at home.  Patient Education         Starting a Weight Loss Plan: Care Instructions  Overview     It can be a challenge to lose weight. But your doctor can help you make a weight-loss plan that meets your needs.  You don't have to make a lot of big changes at once. A better idea might be to focus on small changes and stick with them. When those changes become habit, you can add a few more changes.  Some people find it helpful to take an exercise or nutrition class. If you have questions, ask your doctor about seeing a registered dietitian or an exercise specialist. You might also think about joining a weight-loss support group.  If you're not ready to make changes right now, try to pick a date in the future. Then make an appointment with your doctor to talk about when and how you'll get started with a plan.  Follow-up care is a key part of your treatment and safety. Be sure to make and go to all appointments, and call your doctor if you are having problems. It's also a good idea to know your test results and keep a list of the medicines you take.  How can you care for yourself at home?  Set realistic

## 2024-04-26 DIAGNOSIS — Z00.00 ENCOUNTER FOR WELL ADULT EXAM WITHOUT ABNORMAL FINDINGS: ICD-10-CM

## 2024-04-26 DIAGNOSIS — K21.9 GASTROESOPHAGEAL REFLUX DISEASE WITHOUT ESOPHAGITIS: ICD-10-CM

## 2024-04-26 LAB
ALBUMIN SERPL-MCNC: 4.4 G/DL (ref 3.4–5)
ALBUMIN/GLOB SERPL: 1.8 {RATIO} (ref 1.1–2.2)
ALP SERPL-CCNC: 90 U/L (ref 40–129)
ALT SERPL-CCNC: 12 U/L (ref 10–40)
ANION GAP SERPL CALCULATED.3IONS-SCNC: 11 MMOL/L (ref 3–16)
AST SERPL-CCNC: 16 U/L (ref 15–37)
BASOPHILS # BLD: 0.1 K/UL (ref 0–0.2)
BASOPHILS NFR BLD: 0.7 %
BILIRUB SERPL-MCNC: 0.5 MG/DL (ref 0–1)
BUN SERPL-MCNC: 15 MG/DL (ref 7–20)
CALCIUM SERPL-MCNC: 9.6 MG/DL (ref 8.3–10.6)
CHLORIDE SERPL-SCNC: 103 MMOL/L (ref 99–110)
CHOLEST SERPL-MCNC: 173 MG/DL (ref 0–199)
CO2 SERPL-SCNC: 28 MMOL/L (ref 21–32)
CREAT SERPL-MCNC: 0.8 MG/DL (ref 0.9–1.3)
DEPRECATED RDW RBC AUTO: 13.2 % (ref 12.4–15.4)
EOSINOPHIL # BLD: 0.7 K/UL (ref 0–0.6)
EOSINOPHIL NFR BLD: 6.7 %
GFR SERPLBLD CREATININE-BSD FMLA CKD-EPI: >90 ML/MIN/{1.73_M2}
GLUCOSE SERPL-MCNC: 87 MG/DL (ref 70–99)
HCT VFR BLD AUTO: 42.4 % (ref 40.5–52.5)
HDLC SERPL-MCNC: 35 MG/DL (ref 40–60)
HGB BLD-MCNC: 14.7 G/DL (ref 13.5–17.5)
LDLC SERPL CALC-MCNC: 121 MG/DL
LYMPHOCYTES # BLD: 2.9 K/UL (ref 1–5.1)
LYMPHOCYTES NFR BLD: 29.4 %
MCH RBC QN AUTO: 28.7 PG (ref 26–34)
MCHC RBC AUTO-ENTMCNC: 34.6 G/DL (ref 31–36)
MCV RBC AUTO: 83 FL (ref 80–100)
MONOCYTES # BLD: 0.7 K/UL (ref 0–1.3)
MONOCYTES NFR BLD: 7 %
NEUTROPHILS # BLD: 5.5 K/UL (ref 1.7–7.7)
NEUTROPHILS NFR BLD: 56.2 %
PLATELET # BLD AUTO: 283 K/UL (ref 135–450)
PMV BLD AUTO: 9 FL (ref 5–10.5)
POTASSIUM SERPL-SCNC: 3.9 MMOL/L (ref 3.5–5.1)
PROT SERPL-MCNC: 6.9 G/DL (ref 6.4–8.2)
RBC # BLD AUTO: 5.11 M/UL (ref 4.2–5.9)
SODIUM SERPL-SCNC: 142 MMOL/L (ref 136–145)
TRIGL SERPL-MCNC: 87 MG/DL (ref 0–150)
VLDLC SERPL CALC-MCNC: 17 MG/DL
WBC # BLD AUTO: 9.8 K/UL (ref 4–11)

## 2025-04-24 DIAGNOSIS — K21.9 GASTROESOPHAGEAL REFLUX DISEASE WITHOUT ESOPHAGITIS: ICD-10-CM

## 2025-04-24 RX ORDER — FAMOTIDINE 20 MG/1
TABLET, FILM COATED ORAL
Qty: 180 TABLET | Refills: 3 | Status: SHIPPED | OUTPATIENT
Start: 2025-04-24

## 2025-04-24 NOTE — PROGRESS NOTES
Detail Level: Detailed
SpO2 95%   BMI 49.47 kg/m²   BP Readings from Last 5 Encounters:   04/09/24 122/80   02/07/23 122/82   12/08/22 122/80   06/16/22 (!) 150/104   06/07/22 138/88     Wt Readings from Last 5 Encounters:   04/09/24 (!) 152 kg (335 lb)   02/07/23 (!) 149.7 kg (330 lb)   12/08/22 (!) 147.4 kg (325 lb)   06/16/22 (!) 146.1 kg (322 lb)   06/07/22 (!) 147 kg (324 lb)   weight gain   GENERAL:   obese  well-developed, alert, no distress.     EYES:   External findings: lids and lashes normal and conjunctivae and sclerae normal  Eyes: no periorbital cellulitis.  ENT:   External nose and ears appear normal  normal TM's and external ear canals both ears  Pharynx: normal. Exudates: None  Lips, mucosa, and tongue normal  Hearing grossly normal.     NECK:   Supple, symmetrical, trachea midline  Thyroid not enlarged, symmetric, no tenderness/mass/nodules  LYMPH:  no cervical nodes, no supraclavicular nodes  LUNGS:    Breathing unlabored  clear to auscultation bilaterally and good air movement  CARDIOVASC:   regular rate and rhythm, S1, S2 normal. No murmur, click, rub or gallop  Apical impulse normal  LEGS:  Lower extremity edema: none    ABDOMEN:   Soft, non-tender, no masses  No hepatosplenomegaly  No hernias noted.  Exam limited by body habitus  SKIN: warm and dry  No rashes or suspicious lesions  No nodules or induration  PSYCH:    Alert and oriented  Normal reasoning, insight good  Facial expressions full, mood appropriate  No memory disturbance noted  MUSCULOSKEL:    Gait normal, assistive device: none  No significant finger or nail findings  Spine symmetric, no deformities, no kyphosis     
Quality 226: Preventive Care And Screening: Tobacco Use: Screening And Cessation Intervention: Patient screened for tobacco use and is an ex/non-smoker